# Patient Record
Sex: FEMALE | Race: BLACK OR AFRICAN AMERICAN | NOT HISPANIC OR LATINO | Employment: FULL TIME | URBAN - METROPOLITAN AREA
[De-identification: names, ages, dates, MRNs, and addresses within clinical notes are randomized per-mention and may not be internally consistent; named-entity substitution may affect disease eponyms.]

---

## 2024-07-19 ENCOUNTER — OFFICE VISIT (OUTPATIENT)
Dept: FAMILY MEDICINE | Facility: CLINIC | Age: 24
End: 2024-07-19
Payer: COMMERCIAL

## 2024-07-19 VITALS
BODY MASS INDEX: 27 KG/M2 | OXYGEN SATURATION: 98 % | HEIGHT: 62 IN | SYSTOLIC BLOOD PRESSURE: 130 MMHG | DIASTOLIC BLOOD PRESSURE: 70 MMHG | WEIGHT: 146.69 LBS | HEART RATE: 120 BPM

## 2024-07-19 DIAGNOSIS — M79.605 LEG PAIN, POSTERIOR, LEFT: Primary | ICD-10-CM

## 2024-07-19 DIAGNOSIS — R00.0 TACHYCARDIA: ICD-10-CM

## 2024-07-19 PROCEDURE — 99999 PR PBB SHADOW E&M-NEW PATIENT-LVL IV: CPT | Mod: PBBFAC,,,

## 2024-07-19 PROCEDURE — 1160F RVW MEDS BY RX/DR IN RCRD: CPT | Mod: CPTII,S$GLB,,

## 2024-07-19 PROCEDURE — 3075F SYST BP GE 130 - 139MM HG: CPT | Mod: CPTII,S$GLB,,

## 2024-07-19 PROCEDURE — 3008F BODY MASS INDEX DOCD: CPT | Mod: CPTII,S$GLB,,

## 2024-07-19 PROCEDURE — 99214 OFFICE O/P EST MOD 30 MIN: CPT | Mod: S$GLB,,,

## 2024-07-19 PROCEDURE — 1159F MED LIST DOCD IN RCRD: CPT | Mod: CPTII,S$GLB,,

## 2024-07-19 PROCEDURE — 3078F DIAST BP <80 MM HG: CPT | Mod: CPTII,S$GLB,,

## 2024-07-19 RX ORDER — NAPROXEN 375 MG/1
TABLET ORAL
COMMUNITY

## 2024-07-19 RX ORDER — CYCLOBENZAPRINE HCL 10 MG
10 TABLET ORAL
COMMUNITY
Start: 2024-07-08 | End: 2024-07-19 | Stop reason: HOSPADM

## 2024-07-19 RX ORDER — LIDOCAINE 50 MG/G
1 PATCH TOPICAL DAILY
Qty: 14 PATCH | Refills: 0 | Status: SHIPPED | OUTPATIENT
Start: 2024-07-19

## 2024-07-19 RX ORDER — METHOCARBAMOL 500 MG/1
500 TABLET, FILM COATED ORAL 4 TIMES DAILY
Qty: 28 TABLET | Refills: 0 | Status: SHIPPED | OUTPATIENT
Start: 2024-07-19 | End: 2024-07-26

## 2024-07-19 RX ORDER — PREDNISONE 20 MG/1
20 TABLET ORAL 2 TIMES DAILY
COMMUNITY
Start: 2024-07-08 | End: 2024-07-19

## 2024-07-19 NOTE — PROGRESS NOTES
Subjective:        Chief Complaint: Sciatica (Left upper leg)     Margy Martin is a 23 y.o. female, patient of Alayna Reid NP with a PMH listed below.   Presents today with complaints of Sciatica (Left upper leg)    Patient presents alone with complaints of left posterior upper leg pain that has been intermittent since May. Denies injury or trauma. Reports pain originally started 1 year ago after pulling a family member off the floor. Reports at that time she sought care, was given medications, and pain resolved. Since May the pain as returned and has been using Naproxen, OTC tylenol, icy hot, and Voltaren gel without much relief. Went to  on 7/8/2024 and given flexeril and prednisone for pain. Reports medications were helping initially but no longer helping in the last few days. Denies any leg weakness, numbness, or tingling. Denies any bowel or bladder incontinence.   No other acute concerns voiced.        History reviewed. No pertinent past medical history.    History reviewed. No pertinent surgical history.      Current Outpatient Medications:     naproxen (NAPROSYN) 375 MG tablet, , Disp: , Rfl:     norethindrone-ethinyl estradiol (MICROGESTIN 1/20) 1-20 mg-mcg per tablet, Take 1 tablet by mouth once daily., Disp: , Rfl:     LIDOcaine (LIDODERM) 5 %, Place 1 patch onto the skin once daily. Remove & Discard patch within 12 hours or as directed by MD, Disp: 14 patch, Rfl: 0    methocarbamoL (ROBAXIN) 500 MG Tab, Take 1 tablet (500 mg total) by mouth 4 (four) times daily. for 7 days, Disp: 28 tablet, Rfl: 0    Review of Systems   Constitutional:  Negative for chills and fever.   Respiratory:  Negative for chest tightness and shortness of breath.    Cardiovascular:  Negative for chest pain and palpitations.   Genitourinary:  Negative for dysuria, frequency and urgency.   Neurological:  Negative for weakness and numbness.         Objective:     Vitals:    07/19/24 1320 07/19/24 1408   BP: 130/70    BP  "Location: Left arm    Patient Position: Sitting    BP Method: Large (Manual)    Pulse: (!) 130 (!) 120   SpO2: 98%    Weight: 66.6 kg (146 lb 11.5 oz)    Height: 5' 2" (1.575 m)           Physical Exam  Vitals reviewed.   Constitutional:       General: She is not in acute distress.     Appearance: She is not ill-appearing, toxic-appearing or diaphoretic.   Cardiovascular:      Rate and Rhythm: Regular rhythm. Tachycardia present.   Pulmonary:      Effort: Pulmonary effort is normal. No respiratory distress.      Breath sounds: Normal breath sounds. No wheezing, rhonchi or rales.   Abdominal:      General: Bowel sounds are normal. There is no distension.      Palpations: Abdomen is soft.      Tenderness: There is no abdominal tenderness. There is no guarding.   Musculoskeletal:         General: Normal range of motion.      Lumbar back: No tenderness. Normal range of motion. Negative left straight leg raise test.      Right lower leg: No edema.      Left lower leg: No edema.      Comments: + contralateral straight leg test.    Skin:     General: Skin is warm and dry.      Findings: No bruising or rash.   Neurological:      Mental Status: She is alert and oriented to person, place, and time.      GCS: GCS eye subscore is 4. GCS verbal subscore is 5. GCS motor subscore is 6.      Sensory: Sensation is intact.      Motor: No weakness.      Gait: Gait normal.   Psychiatric:         Speech: Speech normal.         Behavior: Behavior is cooperative.           Assessment:         ICD-10-CM ICD-9-CM   1. Leg pain, posterior, left  M79.605 729.5   2. Tachycardia  R00.0 785.0       Plan:       Leg pain, posterior, left  -     LIDOcaine (LIDODERM) 5 %; Place 1 patch onto the skin once daily. Remove & Discard patch within 12 hours or as directed by MD  Dispense: 14 patch; Refill: 0  -     Ambulatory referral/consult to Physical/Occupational Therapy; Future; Expected date: 07/26/2024  -     methocarbamoL (ROBAXIN) 500 MG Tab; Take " 1 tablet (500 mg total) by mouth 4 (four) times daily. for 7 days  Dispense: 28 tablet; Refill: 0  -Lidocaine patch daily PRN pain.  -Continue Naproxen (has at home) for pain.   -Robaxin PRN pain. Advised not to drink or drive on medication.   -PT referral for pain. Patient agreeable to plan.   -Follow up with PCP if symptoms worsen or not improving with treatment.     Tachycardia  -     IN OFFICE EKG 12-LEAD (to [place])  -130's HR in clinic. Clinic EKG shows sinus tachycardia, . Patient verbalizes she is nervous but asymptomatic otherwise.   -Elevated HR likely pain related, reports 10/10 pain currently.   -Follow up with PCP in 1 week for reevaluation.       F/u with Dario florentino PCP in 1 week for HR.     RTC/ED precautions discussed where applicable.   Patient/caretaker agrees with the treatment plan.     Encouraged patient/caregiver to check with insurance regarding orders to avoid unexpected fees/costs.   Encouraged patient/caregiver to let me know if there are any further questions/concerns.   Encouraged patient/caregiver to keep all appointments with PCP and all specialist.      No follow-ups on file.    DINORA Garber  Family Medicine  [facility]

## 2024-07-23 LAB
OHS QRS DURATION: 60 MS
OHS QTC CALCULATION: 415 MS

## 2024-07-29 ENCOUNTER — OFFICE VISIT (OUTPATIENT)
Dept: FAMILY MEDICINE | Facility: CLINIC | Age: 24
End: 2024-07-29
Payer: COMMERCIAL

## 2024-07-29 VITALS
HEART RATE: 98 BPM | WEIGHT: 147.19 LBS | BODY MASS INDEX: 27.08 KG/M2 | OXYGEN SATURATION: 99 % | DIASTOLIC BLOOD PRESSURE: 74 MMHG | HEIGHT: 62 IN | SYSTOLIC BLOOD PRESSURE: 116 MMHG

## 2024-07-29 DIAGNOSIS — Z12.4 SCREENING FOR CERVICAL CANCER: ICD-10-CM

## 2024-07-29 DIAGNOSIS — Z00.00 HEALTHCARE MAINTENANCE: Primary | ICD-10-CM

## 2024-07-29 DIAGNOSIS — M25.552 PAIN IN LEFT HIP: ICD-10-CM

## 2024-07-29 DIAGNOSIS — F41.1 GAD (GENERALIZED ANXIETY DISORDER): ICD-10-CM

## 2024-07-29 DIAGNOSIS — F51.04 PSYCHOPHYSIOLOGICAL INSOMNIA: ICD-10-CM

## 2024-07-29 PROBLEM — R29.898 HIP WEAKNESS: Status: ACTIVE | Noted: 2024-07-29

## 2024-07-29 PROCEDURE — 99999 PR PBB SHADOW E&M-EST. PATIENT-LVL IV: CPT | Mod: PBBFAC,,, | Performed by: STUDENT IN AN ORGANIZED HEALTH CARE EDUCATION/TRAINING PROGRAM

## 2024-07-29 PROCEDURE — 3078F DIAST BP <80 MM HG: CPT | Mod: CPTII,S$GLB,, | Performed by: STUDENT IN AN ORGANIZED HEALTH CARE EDUCATION/TRAINING PROGRAM

## 2024-07-29 PROCEDURE — 99204 OFFICE O/P NEW MOD 45 MIN: CPT | Mod: S$GLB,,, | Performed by: STUDENT IN AN ORGANIZED HEALTH CARE EDUCATION/TRAINING PROGRAM

## 2024-07-29 PROCEDURE — 3074F SYST BP LT 130 MM HG: CPT | Mod: CPTII,S$GLB,, | Performed by: STUDENT IN AN ORGANIZED HEALTH CARE EDUCATION/TRAINING PROGRAM

## 2024-07-29 PROCEDURE — 3008F BODY MASS INDEX DOCD: CPT | Mod: CPTII,S$GLB,, | Performed by: STUDENT IN AN ORGANIZED HEALTH CARE EDUCATION/TRAINING PROGRAM

## 2024-07-29 PROCEDURE — 1160F RVW MEDS BY RX/DR IN RCRD: CPT | Mod: CPTII,S$GLB,, | Performed by: STUDENT IN AN ORGANIZED HEALTH CARE EDUCATION/TRAINING PROGRAM

## 2024-07-29 PROCEDURE — 1159F MED LIST DOCD IN RCRD: CPT | Mod: CPTII,S$GLB,, | Performed by: STUDENT IN AN ORGANIZED HEALTH CARE EDUCATION/TRAINING PROGRAM

## 2024-07-29 RX ORDER — NAPROXEN SODIUM 220 MG
220 TABLET ORAL DAILY PRN
COMMUNITY

## 2024-07-29 RX ORDER — METHOCARBAMOL 500 MG/1
500 TABLET, FILM COATED ORAL DAILY PRN
COMMUNITY

## 2024-07-29 RX ORDER — HYDROXYZINE HYDROCHLORIDE 50 MG/1
50 TABLET, FILM COATED ORAL NIGHTLY PRN
Qty: 30 TABLET | Refills: 0 | Status: SHIPPED | OUTPATIENT
Start: 2024-07-29 | End: 2024-08-28

## 2024-07-29 RX ORDER — FLUOXETINE 10 MG/1
10 CAPSULE ORAL DAILY
Qty: 30 CAPSULE | Refills: 11 | Status: SHIPPED | OUTPATIENT
Start: 2024-07-29 | End: 2025-07-29

## 2024-07-29 NOTE — ASSESSMENT & PLAN NOTE
Sleep Hygiene Education: Advised patient on the importance of maintaining consistent sleep schedule, creating a comfortable sleep environment, and practicing relaxation techniques before bedtime.   Cognitive-Behavioral Therapy for Insomnia (CBT-I): Recommended CBT-I techniques such as stimulus control, sleep restriction, and cognitive restructuring to improve sleep quality and quantity.  Medication therapy: Considered short-term use of hydroxizine for symptomatic relief if non-pharmacological measures are insufficient.  Follow-up: Scheduled for follow-up in 1 month to assess response to treatment and further tailor management plan. Encouraged to keep a sleep diary to track progress and identify triggers.

## 2024-07-29 NOTE — ASSESSMENT & PLAN NOTE
Patient advised to schedule regular physical examination and screenings according to age and risk factors. Recommended screenings include but are not limited to: blood pressure measurement, cholesterol panel, fasting blood glucose, colorectal cancer screening, mammography, Pap smear, PSA (after shared decision making), and bone density screening for osteoporosis.  Encouraged patient to maintain a healthy lifestyle through regular exercise, balanced diet, adequate sleep, and avoidance of tobacco and excessive alcohol consumption. Additionally discussed the importance of sun protection and skin cancer screening, dental check-ups, and immunizations according to CDC guidelines. Follow up in 12 months for routine physical examination and assessment of healthcare maintenance goals.

## 2024-07-29 NOTE — PROGRESS NOTES
Subjective     Patient ID: Margy Martin is a 23 y.o. female.    Chief Complaint: Establish Care and Anxiety (Pt states has bad anxiety causing her having problems to sleep at night )    The patient presents with a chief complaint of anxiety, which has been ongoing since the passing of her mother in March of the previous year. She has not sought therapy or seen a psychotherapist for this issue. The patient also reports having experienced leg pain a couple of weeks ago, for which she has recently started physical therapy. In addition to her anxiety, the patient is experiencing insomnia and has been using Unisom for sleep, as melatonin was not effective. She is currently taking Robaxin with Naproxen for her leg pain and is on birth control. The patient has not been consuming alcohol recently due to her use of Robaxin.     Anxiety  Symptoms include nervous/anxious behavior. Patient reports no chest pain, nausea, palpitations, shortness of breath or suicidal ideas.         Review of Systems   Constitutional:  Negative for appetite change, chills, fever and unexpected weight change.   Respiratory:  Negative for shortness of breath and wheezing.    Cardiovascular:  Negative for chest pain, palpitations and leg swelling.   Gastrointestinal:  Negative for abdominal distention, abdominal pain, anal bleeding, blood in stool, constipation, nausea and vomiting.   Endocrine: Negative for cold intolerance, heat intolerance, polydipsia, polyphagia and polyuria.   Musculoskeletal:  Negative for arthralgias and myalgias.   Neurological:  Negative for seizures, syncope, speech difficulty, weakness and numbness.   Psychiatric/Behavioral:  Positive for sleep disturbance. Negative for self-injury and suicidal ideas. The patient is nervous/anxious.           Objective     Physical Exam  Constitutional:       General: She is not in acute distress.     Appearance: She is normal weight.   HENT:      Head: Normocephalic and atraumatic.       Nose: Nose normal.      Mouth/Throat:      Mouth: Mucous membranes are moist.   Eyes:      Extraocular Movements: Extraocular movements intact.      Conjunctiva/sclera: Conjunctivae normal.      Pupils: Pupils are equal, round, and reactive to light.   Cardiovascular:      Rate and Rhythm: Normal rate and regular rhythm.      Heart sounds: No murmur heard.     No friction rub. No gallop.   Pulmonary:      Effort: Pulmonary effort is normal.      Breath sounds: Normal breath sounds. No stridor. No wheezing, rhonchi or rales.   Abdominal:      General: Abdomen is flat. There is no distension.      Palpations: Abdomen is soft. There is no mass.      Tenderness: There is no abdominal tenderness. There is no guarding.   Musculoskeletal:         General: Normal range of motion.      Cervical back: Normal range of motion.      Right lower leg: No edema.      Left lower leg: No edema.   Skin:     General: Skin is warm.   Neurological:      General: No focal deficit present.      Mental Status: She is alert. Mental status is at baseline.   Psychiatric:         Mood and Affect: Mood normal.         Behavior: Behavior normal.            Assessment and Plan     1. Healthcare maintenance  Assessment & Plan:  Patient advised to schedule regular physical examination and screenings according to age and risk factors. Recommended screenings include but are not limited to: blood pressure measurement, cholesterol panel, fasting blood glucose, colorectal cancer screening, mammography, Pap smear, PSA (after shared decision making), and bone density screening for osteoporosis.  Encouraged patient to maintain a healthy lifestyle through regular exercise, balanced diet, adequate sleep, and avoidance of tobacco and excessive alcohol consumption. Additionally discussed the importance of sun protection and skin cancer screening, dental check-ups, and immunizations according to CDC guidelines. Follow up in 12 months for routine physical  examination and assessment of healthcare maintenance goals.     Orders:  -     Hepatitis C Antibody; Future; Expected date: 07/29/2024  -     HIV 1/2 Ag/Ab (4th Gen); Future; Expected date: 07/29/2024  -     Lipid Panel; Future; Expected date: 07/29/2024  -     Comprehensive Metabolic Panel; Future; Expected date: 07/29/2024  -     CBC Auto Differential; Future; Expected date: 07/29/2024    2. Screening for cervical cancer  Assessment & Plan:  Obgyn consult    Orders:  -     Ambulatory referral/consult to Obstetrics / Gynecology; Future; Expected date: 08/05/2024    3. FINA (generalized anxiety disorder)  Assessment & Plan:  Medication therapy: Prescribed fluoxetine  to alleviate symptoms and improve mood. Discussed potential side effects and expected therapeutic effects.   Psychotherapy/psychiatry referral: Referred patient to psychiatry for cognitive-behavioral therapy (CBT) or other psychotherapy modalities to address underlying psychological issues and coping strategies.  Lifestyle modifications: Advised patient on the importance of regular exercise, adequate sleep, healthy diet, and stress reduction techniques (e.g mindfulness, relaxation exercises) in managing symptoms.  Follow up: Scheduled for regular follow up visits to monitor response to treatment and adjust therapy as needed. Encouraged patient to report any worsening symptoms or emergence of suicidal thoughts immediately for prompt intervention.     Orders:  -     Ambulatory referral/consult to Psychiatry; Future; Expected date: 08/05/2024    4. Psychophysiological insomnia  Assessment & Plan:  Sleep Hygiene Education: Advised patient on the importance of maintaining consistent sleep schedule, creating a comfortable sleep environment, and practicing relaxation techniques before bedtime.   Cognitive-Behavioral Therapy for Insomnia (CBT-I): Recommended CBT-I techniques such as stimulus control, sleep restriction, and cognitive restructuring to improve  sleep quality and quantity.  Medication therapy: Considered short-term use of hydroxizine for symptomatic relief if non-pharmacological measures are insufficient.  Follow-up: Scheduled for follow-up in 1 month to assess response to treatment and further tailor management plan. Encouraged to keep a sleep diary to track progress and identify triggers.     Orders:  -     Ambulatory referral/consult to Psychiatry; Future; Expected date: 08/05/2024    5. Pain in left hip  Assessment & Plan:  C/w PT      Other orders  -     hydrOXYzine (ATARAX) 50 MG tablet; Take 1 tablet (50 mg total) by mouth nightly as needed for Anxiety.  Dispense: 30 tablet; Refill: 0  -     FLUoxetine 10 MG capsule; Take 1 capsule (10 mg total) by mouth once daily.  Dispense: 30 capsule; Refill: 11        Patient will check with prior PCP if she had Tdap and HPV vaccines and will get back to us.       No follow-ups on file.

## 2024-07-29 NOTE — ASSESSMENT & PLAN NOTE
Medication therapy: Prescribed fluoxetine  to alleviate symptoms and improve mood. Discussed potential side effects and expected therapeutic effects.   Psychotherapy/psychiatry referral: Referred patient to psychiatry for cognitive-behavioral therapy (CBT) or other psychotherapy modalities to address underlying psychological issues and coping strategies.  Lifestyle modifications: Advised patient on the importance of regular exercise, adequate sleep, healthy diet, and stress reduction techniques (e.g mindfulness, relaxation exercises) in managing symptoms.  Follow up: Scheduled for regular follow up visits to monitor response to treatment and adjust therapy as needed. Encouraged patient to report any worsening symptoms or emergence of suicidal thoughts immediately for prompt intervention.

## 2024-08-23 ENCOUNTER — OFFICE VISIT (OUTPATIENT)
Dept: FAMILY MEDICINE | Facility: CLINIC | Age: 24
End: 2024-08-23
Payer: COMMERCIAL

## 2024-08-23 VITALS
TEMPERATURE: 99 F | BODY MASS INDEX: 27.18 KG/M2 | HEART RATE: 91 BPM | WEIGHT: 147.69 LBS | SYSTOLIC BLOOD PRESSURE: 122 MMHG | HEIGHT: 62 IN | DIASTOLIC BLOOD PRESSURE: 78 MMHG

## 2024-08-23 DIAGNOSIS — F41.1 GAD (GENERALIZED ANXIETY DISORDER): Primary | ICD-10-CM

## 2024-08-23 PROCEDURE — 99999 PR PBB SHADOW E&M-EST. PATIENT-LVL III: CPT | Mod: PBBFAC,,, | Performed by: STUDENT IN AN ORGANIZED HEALTH CARE EDUCATION/TRAINING PROGRAM

## 2024-08-23 RX ORDER — NORETHINDRONE ACETATE AND ETHINYL ESTRADIOL .02; 1 MG/1; MG/1
1 TABLET ORAL DAILY
Qty: 30 TABLET | Refills: 11 | Status: SHIPPED | OUTPATIENT
Start: 2024-08-23 | End: 2025-08-23

## 2024-08-23 RX ORDER — PROPRANOLOL HYDROCHLORIDE 20 MG/1
20 TABLET ORAL 3 TIMES DAILY
Qty: 90 TABLET | Refills: 0 | Status: SHIPPED | OUTPATIENT
Start: 2024-08-23 | End: 2025-08-23

## 2024-08-23 NOTE — PROGRESS NOTES
Subjective     Patient ID: Margy Martin is a 24 y.o. female.    Chief Complaint: No chief complaint on file.    They tried taking an antidepressant around their birthday but did not like the emotionless, zombie-like feeling it caused, so they stopped taking it. The patient has been using an vistaril during the day instead of at night, as it helps them stay calm at work but does not aid in sleep. The patient is hesitant to try other antidepressants due to the emotional blunting they experienced with the previous medication. They report that their anxiety causes physical symptoms, such as hand shaking. The patient inquires about the potential need to decrease their caffeine intake, as they feel more shaky after consuming it. They also request a refill of their Microgestin prescription. The patient has a pap smear scheduled for the 20th.      Review of Systems   Constitutional:  Negative for appetite change, chills, fever and unexpected weight change.   Respiratory:  Negative for shortness of breath and wheezing.    Cardiovascular:  Negative for chest pain, palpitations and leg swelling.   Gastrointestinal:  Negative for abdominal distention, abdominal pain, anal bleeding, blood in stool, constipation, nausea and vomiting.   Endocrine: Negative for cold intolerance, heat intolerance, polydipsia, polyphagia and polyuria.   Musculoskeletal:  Negative for arthralgias and myalgias.   Neurological:  Positive for tremors. Negative for seizures, syncope, speech difficulty, weakness and numbness.   Psychiatric/Behavioral:  Negative for self-injury and suicidal ideas. The patient is nervous/anxious.           Objective     Physical Exam  Constitutional:       General: She is not in acute distress.     Appearance: She is normal weight.   HENT:      Head: Normocephalic and atraumatic.      Nose: Nose normal.      Mouth/Throat:      Mouth: Mucous membranes are moist.   Eyes:      Extraocular Movements: Extraocular movements  intact.      Conjunctiva/sclera: Conjunctivae normal.      Pupils: Pupils are equal, round, and reactive to light.   Cardiovascular:      Rate and Rhythm: Normal rate and regular rhythm.      Heart sounds: No murmur heard.     No friction rub. No gallop.   Pulmonary:      Effort: Pulmonary effort is normal.      Breath sounds: Normal breath sounds. No stridor. No wheezing, rhonchi or rales.   Abdominal:      General: Abdomen is flat. There is no distension.      Palpations: Abdomen is soft. There is no mass.      Tenderness: There is no abdominal tenderness. There is no guarding.   Musculoskeletal:         General: Normal range of motion.      Cervical back: Normal range of motion.      Right lower leg: No edema.      Left lower leg: No edema.   Skin:     General: Skin is warm.   Neurological:      General: No focal deficit present.      Mental Status: She is alert. Mental status is at baseline.   Psychiatric:         Mood and Affect: Mood normal.         Behavior: Behavior normal.            Assessment and Plan     1. FINA (generalized anxiety disorder)  Assessment & Plan:  Did not tolerate fluoxetine  Propranolol to manage physical symptoms of anxiety  Follow up prn      Other orders  -     propranoloL (INDERAL) 20 MG tablet; Take 1 tablet (20 mg total) by mouth 3 (three) times daily.  Dispense: 90 tablet; Refill: 0  -     norethindrone-ethinyl estradiol (MICROGESTIN 1/20) 1-20 mg-mcg per tablet; Take 1 tablet by mouth once daily.  Dispense: 30 tablet; Refill: 11                 No follow-ups on file.

## 2024-10-28 PROBLEM — Z00.00 HEALTHCARE MAINTENANCE: Status: RESOLVED | Noted: 2024-07-29 | Resolved: 2024-10-28

## 2024-11-15 ENCOUNTER — OFFICE VISIT (OUTPATIENT)
Dept: OBSTETRICS AND GYNECOLOGY | Facility: CLINIC | Age: 24
End: 2024-11-15
Payer: COMMERCIAL

## 2024-11-15 VITALS
DIASTOLIC BLOOD PRESSURE: 72 MMHG | SYSTOLIC BLOOD PRESSURE: 110 MMHG | HEART RATE: 93 BPM | WEIGHT: 146.5 LBS | BODY MASS INDEX: 26.96 KG/M2 | HEIGHT: 62 IN

## 2024-11-15 DIAGNOSIS — Z01.419 ENCNTR FOR GYN EXAM (GENERAL) (ROUTINE) W/O ABN FINDINGS: ICD-10-CM

## 2024-11-15 DIAGNOSIS — Z12.4 SCREENING FOR CERVICAL CANCER: Primary | ICD-10-CM

## 2024-11-15 PROCEDURE — 99999 PR PBB SHADOW E&M-EST. PATIENT-LVL III: CPT | Mod: PBBFAC,,, | Performed by: STUDENT IN AN ORGANIZED HEALTH CARE EDUCATION/TRAINING PROGRAM

## 2024-11-15 RX ORDER — NORETHINDRONE ACETATE AND ETHINYL ESTRADIOL .02; 1 MG/1; MG/1
1 TABLET ORAL DAILY
Qty: 30 TABLET | Refills: 11 | Status: SHIPPED | OUTPATIENT
Start: 2024-11-15 | End: 2025-11-15

## 2024-11-15 NOTE — PROGRESS NOTES
Subjective:    Patient ID: Margy Martin is a 24 y.o. y.o. female.     Chief Complaint: Annual Well Woman Exam     History of Present Illness:  Margy presents today for Annual Well Woman exam. She describes her menses as regular every month without intermenstrual spotting.She denies pelvic pain.  She denies breast tenderness, masses, nipple discharge. She denies difficulty with urination or bowel movements. s. She is sexually active. Contraception is by oral contraceptives (estrogen/progesterone).      Menstrual History:   Patient's last menstrual period was 10/30/2024 (exact date)..     OB History          0    Para   0    Term   0       0    AB   0    Living   0         SAB   0    IAB   0    Ectopic   0    Multiple   0    Live Births   0                 The following portions of the patient's history were reviewed and updated as appropriate: allergies, current medications, past family history, past medical history, past social history, past surgical history, and problem list.    ROS:   CONSTITUTIONAL: Negative for fever, chills, diaphoresis, weakness, fatigue, weight loss, weight gain  ENT: negative for sore throat, nasal congestion, nasal discharge, epistaxis, tinnitus, hearing loss  EYES: negative for blurry vision, decreased vision, loss of vision, eye pain, diplopia, photophobia, discharge  SKIN: Negative for rash, itching, hives  RESPIRATORY: negative for cough, hemoptysis, shortness of breath, pleuritic chest pain, wheezing  CARDIOVASCULAR: negative for chest pain, dyspnea on exertion, orthopnea, paroxysmal nocturnal dyspnea, edema, palpitations  BREAST: negative for breast  tenderness, breast mass, nipple discharge, or skin changes  GASTROINTESTINAL: negative for abdominal pain, flank pain, nausea, vomiting, diarrhea, constipation, black stool, blood in stool  GENITOURINARY: negative for abnormal vaginal bleeding, amenorrhea, decreased libido, dysuria, genital sores, hematuria,  incontinence, menorrhagia, pelvic pain, urinary frequency, vaginal discharge  HEMATOLOGIC/LYMPHATIC: negative for swollen lymph nodes, bleeding, bruising  MUSCULOSKELETAL: negative for back pain, joint pain, joint stiffness, joint swelling, muscle pain, muscle weakness  NEUROLOGICAL: negative for dizzy/vertigo, headache, focal weakness, numbness/tingling, speech problems, loss of consciousness, confusion, memory loss  BEHAVORIAL/PSYCH: negative for anxiety, depression, psychosis  ENDOCRINE: negative for polydipsia/polyuria, palpitations, skin changes, temperature intolerance, unexpected weight changes  ALLERGIC/IMMUNOLOGIC: negative for urticaria, hay fever, angioedema      Objective:    Vital Signs:  Vitals:    11/15/24 1400   BP: 110/72   Pulse: 93       Physical Exam:  General:  alert, cooperative, no distress   Skin:  Skin color, texture, turgor normal. No rashes or lesions   HEENT:  conjunctivae/corneas clear. PERRL.   Neck: supple, trachea midline, no adenopathy or thyromegally   Respiratory:  Normal effort   Breasts:  no discharge, erythema, tenderness, or palpable masses; no axillary lymphadenopathy   Abdomen:  soft, nontender, no palpable masses   Pelvis: External genitalia: normal general appearance  Urinary system: urethral meatus normal, bladder nontender  Vaginal: normal mucosa without prolapse or lesions  Cervix: normal appearance  Uterus: normal size, shape, position  Adnexa: normal size, nontender bilaterally   Extremities: Normal ROM; no edema, no cyanosis   Neurologial: Normal strength and tone. No focal numbness or weakness.   Psychiatric: normal mood, speech, dress, and thought processes         Assessment:       Healthy female exam.     1. Screening for cervical cancer    2. Encntr for gyn exam (general) (routine) w/o abn findings          Plan:      Problem List Items Addressed This Visit          Renal/    Screening for cervical cancer - Primary    Relevant Orders    Liquid-Based Pap Smear,  Screening     Other Visit Diagnoses       Encntr for gyn exam (general) (routine) w/o abn findings        Relevant Medications    norethindrone-ethinyl estradiol (MICROGESTIN 1/20) 1-20 mg-mcg per tablet            COUNSELING:  Margy was counseled on STD prevention, use and side-effects of various contraceptive measures, A.C.O.G. Pap guidelines and recommendations for yearly pelvic exams in addition to recommendations for monthly self breast exams; to see her PCP for other health maintenance.

## 2024-11-21 ENCOUNTER — PATIENT MESSAGE (OUTPATIENT)
Dept: OBSTETRICS AND GYNECOLOGY | Facility: CLINIC | Age: 24
End: 2024-11-21
Payer: COMMERCIAL